# Patient Record
Sex: MALE | Race: WHITE | NOT HISPANIC OR LATINO | Employment: OTHER | URBAN - METROPOLITAN AREA
[De-identification: names, ages, dates, MRNs, and addresses within clinical notes are randomized per-mention and may not be internally consistent; named-entity substitution may affect disease eponyms.]

---

## 2018-12-03 ENCOUNTER — TELEPHONE (OUTPATIENT)
Dept: UROLOGY | Facility: CLINIC | Age: 83
End: 2018-12-03

## 2018-12-03 NOTE — TELEPHONE ENCOUNTER
Spoke to patient - r/s appointment with USMAN Hensley  due to provider will not be in office- ok with patient

## 2018-12-18 DIAGNOSIS — Z12.5 ENCOUNTER FOR PROSTATE CANCER SCREENING: Primary | ICD-10-CM

## 2018-12-21 ENCOUNTER — OFFICE VISIT (OUTPATIENT)
Dept: UROLOGY | Facility: CLINIC | Age: 83
End: 2018-12-21
Payer: MEDICARE

## 2018-12-21 VITALS
HEART RATE: 70 BPM | SYSTOLIC BLOOD PRESSURE: 122 MMHG | BODY MASS INDEX: 33.43 KG/M2 | DIASTOLIC BLOOD PRESSURE: 64 MMHG | HEIGHT: 67 IN | WEIGHT: 213 LBS

## 2018-12-21 DIAGNOSIS — C61 PROSTATE CANCER (HCC): Primary | ICD-10-CM

## 2018-12-21 PROCEDURE — 99213 OFFICE O/P EST LOW 20 MIN: CPT | Performed by: PHYSICIAN ASSISTANT

## 2018-12-21 RX ORDER — ERGOCALCIFEROL (VITAMIN D2) 10 MCG
TABLET ORAL
COMMUNITY

## 2018-12-21 RX ORDER — CHLORAL HYDRATE 500 MG
1 CAPSULE ORAL 3 TIMES DAILY
COMMUNITY

## 2018-12-21 RX ORDER — SIMVASTATIN 20 MG
20 TABLET ORAL
COMMUNITY

## 2018-12-21 RX ORDER — DILTIAZEM HYDROCHLORIDE 240 MG/1
240 CAPSULE, COATED, EXTENDED RELEASE ORAL
COMMUNITY
Start: 2018-10-18

## 2018-12-21 RX ORDER — CHOLECALCIFEROL (VITAMIN D3) 25 MCG
CAPSULE ORAL
COMMUNITY

## 2018-12-21 RX ORDER — DIGOXIN 125 MCG
0.12 TABLET ORAL
COMMUNITY
Start: 2018-10-18

## 2018-12-21 RX ORDER — FUROSEMIDE 20 MG/1
20 TABLET ORAL
COMMUNITY
Start: 2018-09-24 | End: 2019-09-24

## 2018-12-21 RX ORDER — LEVOTHYROXINE SODIUM 0.1 MG/1
100 TABLET ORAL
COMMUNITY
Start: 2018-09-13

## 2018-12-21 RX ORDER — CHOLECALCIFEROL (VITAMIN D3) 125 MCG
100 CAPSULE ORAL
COMMUNITY
Start: 2012-11-13

## 2018-12-21 RX ORDER — METOPROLOL TARTRATE 50 MG/1
TABLET, FILM COATED ORAL
COMMUNITY
Start: 2018-11-28

## 2018-12-21 RX ORDER — MAGNESIUM CHLORIDE 64 MG
TABLET, DELAYED RELEASE (ENTERIC COATED) ORAL
COMMUNITY

## 2018-12-21 NOTE — PROGRESS NOTES
UROLOGY PROGRESS NOTE   Patient Identifiers: Thai Du (MRN [id number])  Date of Service: 12/21/2018    Subjective:     19-year-old man with a history of [place] 7 prostate cancer  He was treated with external beam radiation and androgen deprivation therapy in 2011  PSA remains 0 05  Urine is clear  He had a cystoscopy for micro hematuria in 2016  He complains of some nocturia he has no incontinence or visible blood  Patient has  complaints of   Nocturia times 2-3         Objective:     VITALS:    Vitals:    12/21/18 1016   BP: 122/64   Pulse: 70           LABS:  No results found for: HGB, HCT, WBC, PLT]    No results found for: NA, K, CL, CO2, BUN, CREATININE, CALCIUM, GLUCOSE]        INPATIENT MEDS:    Current Outpatient Prescriptions:     apixaban (ELIQUIS) 2 5 mg, Take 2 5 mg by mouth, Disp: , Rfl:     Cholecalciferol (VITAMIN D-3) 1000 units CAPS, Take by mouth, Disp: , Rfl:     co-enzyme Q-10 100 mg capsule, Take 100 mg by mouth, Disp: , Rfl:     digoxin (LANOXIN) 0 125 mg tablet, Take 0 125 mg by mouth, Disp: , Rfl:     diltiazem (CARDIZEM CD) 240 mg 24 hr capsule, Take 240 mg by mouth, Disp: , Rfl:     furosemide (LASIX) 20 mg tablet, Take 20 mg by mouth, Disp: , Rfl:     hydrocortisone-pramoxine (ANALPRAM-HC) 2 5-1 % rectal cream, Reported on 1/20/2017 , Disp: , Rfl:     Ibrutinib 140 MG CAPS, Take 420 mg by mouth, Disp: , Rfl:     levothyroxine 100 mcg tablet, Take 100 mcg by mouth, Disp: , Rfl:     magnesium chloride (MAG64) 64 MG TBEC EC tablet, Take by mouth, Disp: , Rfl:     metoprolol tartrate (LOPRESSOR) 50 mg tablet, , Disp: , Rfl:     Multiple Vitamin (DAILY VALUE MULTIVITAMIN) TABS, Take by mouth, Disp: , Rfl:     Omega-3 Fatty Acids (FISH OIL) 1,000 mg, Take 1 capsule by mouth 3 (three) times a day, Disp: , Rfl:     simvastatin (ZOCOR) 20 mg tablet, Take 20 mg by mouth daily at bedtime, Disp: , Rfl:       Physical Exam:   /64 (BP Location: Left arm, Patient Position: Sitting, Cuff Size: Adult)   Pulse 70   Ht 5' 7" (1 702 m)   Wt 96 6 kg (213 lb)   BMI 33 36 kg/m²   GEN: no acute distress    RESP: breathing comfortably with no accessory muscle use    ABD: soft, non-tender, non-distended   INCISION:    EXT: no significant peripheral edema   (Male): Penis circumcised, phallus normal, meatus patent  Testicles descended into scrotum bilaterally without masses nor tenderness  No inguinal hernias bilaterally  MEG: Prostate  flat smooth with no nodules  The prostate is not boggy  The prostate is not tender  No nodules noted    RADIOLOGY:    none     Assessment:    1   Prostate cancer     Plan:   - follow-up in 1 year with PSA prior to visit  -  -  -

## 2019-12-31 ENCOUNTER — OFFICE VISIT (OUTPATIENT)
Dept: UROLOGY | Facility: CLINIC | Age: 84
End: 2019-12-31
Payer: MEDICARE

## 2019-12-31 VITALS
WEIGHT: 203 LBS | SYSTOLIC BLOOD PRESSURE: 120 MMHG | DIASTOLIC BLOOD PRESSURE: 64 MMHG | HEART RATE: 70 BPM | BODY MASS INDEX: 31.86 KG/M2 | HEIGHT: 67 IN

## 2019-12-31 DIAGNOSIS — C61 PROSTATE CANCER (HCC): Primary | ICD-10-CM

## 2019-12-31 PROCEDURE — 99213 OFFICE O/P EST LOW 20 MIN: CPT | Performed by: PHYSICIAN ASSISTANT

## 2019-12-31 NOTE — PROGRESS NOTES
UROLOGY PROGRESS NOTE   Patient Identifiers: Loc Felton (MRN [id number])  Date of Service: 12/31/2019    Subjective:    59-year-old man history of [place] 7 stage II prostate cancer  He was treated with external beam radiation and ADT in 2011  His PSA is 0 03  AUA index score is 7  He had a cystoscopy for micro hematuria in 2016  No incontinence or visible blood  No other complaints  Reason for visit:   Prostate cancer follow-up     Objective:     VITALS:    Vitals:    12/31/19 1413   BP: 120/64   Pulse: 70     AUA SYMPTOM SCORE      Most Recent Value   AUA SYMPTOM SCORE   How often have you had a sensation of not emptying your bladder completely after you finished urinating? 0   How often have you had to urinate again less than two hours after you finished urinating? 2   How often have you found you stopped and started again several times when you urinate?  0   How often have you found it difficult to postpone urination? 1   How often have you had a weak urinary stream?  1   How often have you had to push or strain to begin urination? 1   How many times did you most typically get up to urinate from the time you went to bed at night until the time you got up in the morning?   2   Quality of Life: If you were to spend the rest of your life with your urinary condition just the way it is now, how would you feel about that?  2   AUA SYMPTOM SCORE  7            LABS:  No results found for: HGB, HCT, WBC, PLT]    No results found for: NA, K, CL, CO2, BUN, CREATININE, CALCIUM, GLUCOSE]        INPATIENT MEDS:    Current Outpatient Medications:     apixaban (ELIQUIS) 2 5 mg, Take 2 5 mg by mouth, Disp: , Rfl:     Cholecalciferol (VITAMIN D-3) 1000 units CAPS, Take by mouth, Disp: , Rfl:     co-enzyme Q-10 100 mg capsule, Take 100 mg by mouth, Disp: , Rfl:     digoxin (LANOXIN) 0 125 mg tablet, Take 0 125 mg by mouth, Disp: , Rfl:     diltiazem (CARDIZEM CD) 240 mg 24 hr capsule, Take 240 mg by mouth, Disp: , Rfl:     hydrocortisone-pramoxine (ANALPRAM-HC) 2 5-1 % rectal cream, Reported on 1/20/2017 , Disp: , Rfl:     Ibrutinib 140 MG CAPS, Take 420 mg by mouth, Disp: , Rfl:     levothyroxine 100 mcg tablet, Take 100 mcg by mouth, Disp: , Rfl:     magnesium chloride (MAG64) 64 MG TBEC EC tablet, Take by mouth, Disp: , Rfl:     metoprolol tartrate (LOPRESSOR) 50 mg tablet, , Disp: , Rfl:     Multiple Vitamin (DAILY VALUE MULTIVITAMIN) TABS, Take by mouth, Disp: , Rfl:     Omega-3 Fatty Acids (FISH OIL) 1,000 mg, Take 1 capsule by mouth 3 (three) times a day, Disp: , Rfl:     simvastatin (ZOCOR) 20 mg tablet, Take 20 mg by mouth daily at bedtime, Disp: , Rfl:     furosemide (LASIX) 20 mg tablet, Take 20 mg by mouth, Disp: , Rfl:       Physical Exam:   /64 (BP Location: Left arm, Patient Position: Sitting, Cuff Size: Adult)   Pulse 70   Ht 5' 7" (1 702 m)   Wt 92 1 kg (203 lb)   BMI 31 79 kg/m²   GEN: no acute distress    RESP: breathing comfortably with no accessory muscle use    ABD: soft, non-tender, non-distended   INCISION:    EXT: no significant peripheral edema   (Male): Penis uncircumcised, phallus normal, meatus patent  Testicles descended into scrotum bilaterally without masses nor tenderness  No inguinal hernias bilaterally  MEG: Prostate is flat and smooth with no rectal masses    RADIOLOGY:    none     Assessment:    1   Prostate cancer     Plan:   - follow-up in 1 year for his annual exam  -  -  -

## 2020-03-31 ENCOUNTER — TELEPHONE (OUTPATIENT)
Dept: UROLOGY | Facility: MEDICAL CENTER | Age: 85
End: 2020-03-31

## 2020-03-31 DIAGNOSIS — R31.0 GROSS HEMATURIA: Primary | ICD-10-CM

## 2020-03-31 NOTE — TELEPHONE ENCOUNTER
Called and spoke to patient at this time, Advised per provider if bleeding gets worse Rebekah John recommends holding eliquis for 3 days  Advised patient continue with hydration and if symptoms change or if he has any other questions or concerns to give our office a call  PAtient states he understands

## 2020-03-31 NOTE — TELEPHONE ENCOUNTER
This is a patient of Dr Delmis Dowd seen by Radha Abebe in [place]  Patient has blood in his urine and he said its dark and red  Please call him at [phone]

## 2020-03-31 NOTE — TELEPHONE ENCOUNTER
Patient at the [facility] office, known for prostate cancer  Patient last office visit 12/31/2019  Called and spoke to patient at this time, Patient states starting Sunday he noticed his urine was "brown" like tea colored  States he has passed about 2 small blood clots one yesterday and one this morning  He states he has no pain when he urinates, he denies lower back pain, flank pain, and abdominal pain  He states he feels just fine otherwise  Patient does complain of frequency  He states his stream seems weaker, but he is able to urinate  Patient denies fever or chills  Patient states he is on a blood thinner eliquis  Patient denies any recent trauma, surgeries or heavy lifting  Patient states he has been aggressively hydrating  Advised patient will place order for urinalysis and urine culture  Advised to continue with aggressive hydration  Advised I will route to the provider for any other recommendations  Advised patient to call office if any symptoms change such as fever, pain increase in blood in urine  Patient verbalizes understanding and agrees to plan

## 2020-03-31 NOTE — TELEPHONE ENCOUNTER
Nikki Medrano it had radiation for prostate cancer in 2011 and is also on Eliquis  If his bleeding continues or gets worse I would have him hold his Eliquis for 3 days prior to resuming

## 2020-11-24 ENCOUNTER — TELEPHONE (OUTPATIENT)
Dept: UROLOGY | Facility: CLINIC | Age: 85
End: 2020-11-24

## 2023-09-08 ENCOUNTER — ANESTHESIA (OUTPATIENT)
Dept: GASTROENTEROLOGY | Facility: HOSPITAL | Age: 88
End: 2023-09-08

## 2023-09-08 ENCOUNTER — ANESTHESIA EVENT (OUTPATIENT)
Dept: GASTROENTEROLOGY | Facility: HOSPITAL | Age: 88
End: 2023-09-08

## 2023-09-08 ENCOUNTER — HOSPITAL ENCOUNTER (OUTPATIENT)
Dept: GASTROENTEROLOGY | Facility: HOSPITAL | Age: 88
Setting detail: OUTPATIENT SURGERY
End: 2023-09-08
Attending: INTERNAL MEDICINE
Payer: MEDICARE

## 2023-09-08 VITALS
HEART RATE: 70 BPM | BODY MASS INDEX: 30.76 KG/M2 | RESPIRATION RATE: 18 BRPM | SYSTOLIC BLOOD PRESSURE: 123 MMHG | OXYGEN SATURATION: 94 % | DIASTOLIC BLOOD PRESSURE: 74 MMHG | WEIGHT: 196 LBS | HEIGHT: 67 IN | TEMPERATURE: 96.8 F

## 2023-09-08 DIAGNOSIS — R13.10 DYSPHAGIA, UNSPECIFIED: ICD-10-CM

## 2023-09-08 PROBLEM — C91.10 CLL (CHRONIC LYMPHOCYTIC LEUKEMIA) (HCC): Status: ACTIVE | Noted: 2023-09-08

## 2023-09-08 PROBLEM — K21.9 GASTROESOPHAGEAL REFLUX DISEASE: Status: ACTIVE | Noted: 2023-09-08

## 2023-09-08 PROBLEM — N18.9 CHRONIC KIDNEY DISEASE: Status: ACTIVE | Noted: 2023-09-08

## 2023-09-08 PROBLEM — Z95.0 PACEMAKER: Status: ACTIVE | Noted: 2023-09-08

## 2023-09-08 PROBLEM — K22.4 ESOPHAGEAL DYSMOTILITY: Status: ACTIVE | Noted: 2023-09-08

## 2023-09-08 PROBLEM — E66.9 OBESITY (BMI 30-39.9): Status: ACTIVE | Noted: 2023-09-08

## 2023-09-08 PROBLEM — I48.21 PERMANENT ATRIAL FIBRILLATION (HCC): Status: ACTIVE | Noted: 2023-09-08

## 2023-09-08 RX ORDER — SODIUM CHLORIDE 9 MG/ML
INJECTION, SOLUTION INTRAVENOUS CONTINUOUS PRN
Status: DISCONTINUED | OUTPATIENT
Start: 2023-09-08 | End: 2023-09-08

## 2023-09-08 RX ORDER — PROPOFOL 10 MG/ML
INJECTION, EMULSION INTRAVENOUS CONTINUOUS PRN
Status: DISCONTINUED | OUTPATIENT
Start: 2023-09-08 | End: 2023-09-08

## 2023-09-08 RX ORDER — LIDOCAINE HYDROCHLORIDE 10 MG/ML
INJECTION, SOLUTION EPIDURAL; INFILTRATION; INTRACAUDAL; PERINEURAL AS NEEDED
Status: DISCONTINUED | OUTPATIENT
Start: 2023-09-08 | End: 2023-09-08

## 2023-09-08 RX ORDER — PROPOFOL 10 MG/ML
INJECTION, EMULSION INTRAVENOUS AS NEEDED
Status: DISCONTINUED | OUTPATIENT
Start: 2023-09-08 | End: 2023-09-08

## 2023-09-08 RX ADMIN — PROPOFOL 100 MCG/KG/MIN: 10 INJECTION, EMULSION INTRAVENOUS at 13:33

## 2023-09-08 RX ADMIN — LIDOCAINE HYDROCHLORIDE 50 MG: 10 INJECTION, SOLUTION EPIDURAL; INFILTRATION; INTRACAUDAL; PERINEURAL at 13:32

## 2023-09-08 RX ADMIN — SODIUM CHLORIDE: 9 INJECTION, SOLUTION INTRAVENOUS at 13:30

## 2023-09-08 RX ADMIN — PROPOFOL 40 MG: 10 INJECTION, EMULSION INTRAVENOUS at 13:33

## 2023-09-08 NOTE — ANESTHESIA POSTPROCEDURE EVALUATION
Post-Op Assessment Note    CV Status:  Stable  Pain Score: 0    Pain management: adequate     Mental Status:  Sleepy   Hydration Status:  Euvolemic   PONV Controlled:  Controlled   Airway Patency:  Patent      Post Op Vitals Reviewed: Yes      Staff: Anesthesiologist, CRNA         No notable events documented.     /62 (09/08/23 1348)    Temp (!) 96.8 °F (36 °C) (09/08/23 1348)    Pulse 70 (09/08/23 1348)   Resp 16 (09/08/23 1348)    SpO2 98 % (09/08/23 1348)

## 2023-09-08 NOTE — ANESTHESIA PREPROCEDURE EVALUATION
Procedure:  EGD    Relevant Problems   ANESTHESIA (within normal limits)      CARDIO   (+) Pacemaker   (+) Permanent atrial fibrillation (HCC)      GI/HEPATIC   (+) Gastroesophageal reflux disease      /RENAL   (+) Chronic kidney disease      Digestive   (+) Esophageal dysmotility      Other   (+) CLL (chronic lymphocytic leukemia) (HCC)   (+) Obesity (BMI 30-39. 9)        Physical Exam    Airway    Mallampati score: II  TM Distance: >3 FB  Neck ROM: full     Dental   Comment: Veneers,     Cardiovascular  Rhythm: regular, Rate: normal,     Pulmonary  Pulmonary exam normal Breath sounds clear to auscultation,     Other Findings        Anesthesia Plan  ASA Score- 3     Anesthesia Type- IV sedation with anesthesia with ASA Monitors. Additional Monitors:   Airway Plan:           Plan Factors-Exercise tolerance (METS): >4 METS. Chart reviewed. EKG reviewed. Existing labs reviewed. Patient summary reviewed. Patient is not a current smoker. Induction-     Postoperative Plan-     Informed Consent- Anesthetic plan and risks discussed with patient. I personally reviewed this patient with the CRNA. Discussed and agreed on the Anesthesia Plan with the CRNA. Matty Gauthier

## 2023-09-08 NOTE — H&P
H&P EXAM - Outpatient Endoscopy   Elyssa Bazan 80 y.o. male MRN: [id number]    [address] [place] [address] [facility] PRE/POST   Encounter: [id number]        Impression: dysphagia    Plan:egd    Chief Complaint: dysphagia    Physical Exam: alert   Chest: clear   Heart: reg

## 2024-07-22 ENCOUNTER — NURSING HOME VISIT (OUTPATIENT)
Dept: GERIATRICS | Facility: OTHER | Age: 89
End: 2024-07-22
Payer: MEDICARE

## 2024-07-22 VITALS
SYSTOLIC BLOOD PRESSURE: 123 MMHG | DIASTOLIC BLOOD PRESSURE: 76 MMHG | TEMPERATURE: 97.6 F | RESPIRATION RATE: 19 BRPM | BODY MASS INDEX: 31.45 KG/M2 | OXYGEN SATURATION: 94 % | WEIGHT: 200.8 LBS | HEART RATE: 71 BPM

## 2024-07-22 DIAGNOSIS — S12.591D OTHER CLOSED NONDISPLACED FRACTURE OF SIXTH CERVICAL VERTEBRA WITH ROUTINE HEALING, SUBSEQUENT ENCOUNTER: Primary | ICD-10-CM

## 2024-07-22 DIAGNOSIS — N18.32 STAGE 3B CHRONIC KIDNEY DISEASE (HCC): ICD-10-CM

## 2024-07-22 DIAGNOSIS — E03.9 ACQUIRED HYPOTHYROIDISM: ICD-10-CM

## 2024-07-22 DIAGNOSIS — K21.9 GASTROESOPHAGEAL REFLUX DISEASE, UNSPECIFIED WHETHER ESOPHAGITIS PRESENT: ICD-10-CM

## 2024-07-22 DIAGNOSIS — C91.10 CLL (CHRONIC LYMPHOCYTIC LEUKEMIA) (HCC): ICD-10-CM

## 2024-07-22 DIAGNOSIS — I10 BENIGN ESSENTIAL HYPERTENSION: Chronic | ICD-10-CM

## 2024-07-22 DIAGNOSIS — I48.21 PERMANENT ATRIAL FIBRILLATION (HCC): ICD-10-CM

## 2024-07-22 DIAGNOSIS — R26.2 AMBULATORY DYSFUNCTION: ICD-10-CM

## 2024-07-22 PROBLEM — I35.8 AORTIC VALVE SCLEROSIS: Status: ACTIVE | Noted: 2023-06-08

## 2024-07-22 PROBLEM — S06.0XAA CONCUSSION: Status: ACTIVE | Noted: 2024-07-18

## 2024-07-22 PROBLEM — S12.500A C6 CERVICAL FRACTURE (HCC): Status: ACTIVE | Noted: 2024-07-17

## 2024-07-22 PROCEDURE — 99306 1ST NF CARE HIGH MDM 50: CPT | Performed by: FAMILY MEDICINE

## 2024-07-22 NOTE — PROGRESS NOTES
[facility]  [address]  [place] [address]  [facility]   SNF [address]  History and Physical    NAME: Tanner Bazan  AGE: 89 y.o. SEX: male [id number]    DATE OF ENCOUNTER: 7/22/2024    Pain: no  Rehab Potential: good  Patient Informed of Medical Condition: yes  Patient is Capable of Understanding Their Right: yes  Prognosis: good  Discharge Plan: home with health services  Surrogate Decision Maker: daughter and son  Advanced Directives: yes  Code status: full code  PCP: Mckay Morfin MD    Assessment and Plan     C6 cervical fracture (HCC)  S/p fall  CT cervical spine 7/17/24 showed nondisplaced fracture line across the anterior and right aspect of the C6 vertebral body, extending to the right lateral aspect of the C6-C7 disc space. No malalignment.   C-collar as per neurosurgery recommendations  OT/PT  Pain controlled with Tylenol PRN  F/u with neurosuregery in 4 weeks    Benign essential hypertension  Currently on diltiazem 120 mg daily and lopressor 25 mg BID for permanent Afib  BP stable since SNF admission  Continue monitoring BP    Permanent atrial fibrillation (HCC)  Permanent Afib with pacemaker  RRR  Continue digoxin 125 mcg daily, diltiazem 120 mg daily, and lopressor 25 mg BID  F/u with cardiology    Gastroesophageal reflux disease  With esophageal stricture s/p dilation  Maintained on omeprazole 20 mg daily for reflux esophagitis    Acquired hypothyroidism  TSH was WNL 0.92 (9/7/23)  Continue home dose of levothyroxine 100 mg daily while in SNF  Needs f/u with Endo and repeat TSH    Chronic kidney disease  Lab Results   Component Value Date    EGFR 41 (L) 07/18/2024    EGFR 34 (L) 07/17/2024    EGFR 46 (L) 07/16/2024    CREATININE 1.57 (H) 07/18/2024    CREATININE 1.83 (H) 07/17/2024    CREATININE 1.42 (H) 07/16/2024   Stable, at baseline  Ensure adequate hydration  Avoid NSAIDs    CLL (chronic lymphocytic leukemia) (HCC)  With WBC normal 4.5, Hgb 14.4, low PLT  106 (7/18/24)  Follows with Dr. Lamb      Ambulatory dysfunction  Ambulatory dysfunction with fall sustaining a C6 fracture  High risk of recurrent fall due to age, history of fall, comorbidities  Admitted to SNF  OT/PT/ nutrition support  Fall precautions    All medications and routine orders were reviewed and updated as needed.  Plan discussed with: Patient. Coordination of care with nursing, OT/PT, SW, dietician.    Chief Complaint     Seen for admission at Skilled Nursing Facility    History of Present Illness     89 y.o. male with HTN, permanent A-fib, CLL, CAD, HLD, stage IIIb CKD, cardiac pacemaker, hypothyroidism, GERD who is seen today, following hospitalization at [facility] from 7/17/2024 to 7/19/2024 after a fall sustaining C6 fracture.  Neurosurgery was consulted, recommending c-collar at all times, and follow-up outpatient in 4 weeks.  His pain was controlled with IV medications, which was later transitioned to oral medications.  Cardiology recommended syncope workup, including checking orthostatics.  His home dose of metoprolol and was adjusted.  Inpatient OT/PT evaluated and recommended to discharge home with outpatient PT/OT.  However, he lives alone and no one to assist   He is sitting in recliner, asking when he can go home to get something in his workshop. Reports good appetite, good sleep. He denies any neck pain, arm pain, tingling or numbness.  No headache, CP, SOB, or palpitations reported.  Denies dysuria. Had BM.    HISTORY:  Social History     Socioeconomic History    Marital status: /Civil Union     Spouse name: None    Number of children: None    Years of education: None    Highest education level: None   Occupational History    None   Tobacco Use    Smoking status: Never    Smokeless tobacco: Never   Substance and Sexual Activity    Alcohol use: Yes    Drug use: No    Sexual activity: None   Other Topics Concern    None   Social History Narrative    None     Social  Determinants of Health     Financial Resource Strain: Low Risk  (7/17/2024)    Received from [facility]    Overall Financial Resource Strain (CARDIA)     Difficulty of Paying Living Expenses: Not hard at all   Food Insecurity: No Food Insecurity (7/17/2024)    Received from [facility]    Hunger Vital Sign     Worried About Running Out of Food in the Last Year: Never true     Ran Out of Food in the Last Year: Never true   Transportation Needs: No Transportation Needs (7/17/2024)    Received from [facility]    PRAPARE - Transportation     Lack of Transportation (Medical): No     Lack of Transportation (Non-Medical): No   Physical Activity: Not on file   Stress: No Stress Concern Present (6/5/2023)    Received from [facility]    [place] [employer] - Occupational Stress Questionnaire     Feeling of Stress : Only a little   Social Connections: Moderately Integrated (6/5/2023)    Received from [facility]    Social Connection and Isolation Panel [NHANES]     Frequency of Communication with Friends and Family: More than three times a week     Frequency of Social Gatherings with Friends and Family: More than three times a week     Attends Jain Services: More than 4 times per year     Active Member of Clubs or Organizations: Yes     Attends Club or Organization Meetings: More than 4 times per year     Marital Status:    Intimate Partner Violence: Not At Risk (7/17/2024)    Received from [facility]    Humiliation, Afraid, Rape, and Kick questionnaire     Fear of Current or Ex-Partner: No     Emotionally Abused: No     Physically Abused: No     Sexually Abused: No   Housing Stability: Low Risk  (7/17/2024)    Received from [facility]    Housing Stability Vital Sign     Unable to Pay for Housing in the Last Year: No     Number of Times Moved in the Last Year: 0      Homeless in the Last Year: No     Allergies:  Allergies   Allergen Reactions    Other      Other reaction(s): GI upset    Penicillins      Other reaction(s): Other (See Comments)  thrush       Review of Systems   As per HPI, otherwise negative  Medications and orders   All medications reviewed and updated in skilled nursing EMR.  Objective     /76   Pulse 71   Temp 97.6 °F (36.4 °C)   Resp 19   Wt 91.1 kg (200 lb 12.8 oz)   SpO2 94%   BMI 31.45 kg/m²     Physical Exam  Vitals and nursing note reviewed.   General: no acute distress.  HENT:      Head: Normocephalic.      Nose: Nose normal.      Mouth: Mucous membranes are moist.      Neck: C-collar in place  Eyes:       Right eye: No discharge.         Left eye: No discharge.      Conjunctivae normal.   Cardiovascular:      Normal rate and regular rhythm. No murmur heard.  Pulmonary:      Pulmonary effort is normal. No wheezing, rhonchi or rales.   Abdominal:      Bowel sounds are normal. There is no distension.      Abdomen is soft. There is no abdominal tenderness.   Musculoskeletal:      Cervical back: Neck supple.      Right lower le+ edema.      Left lower le+ edema.   Skin:     General: Skin is warm. Hyperpigmentation 2/2 chronic venous stasis  Neurological: alert.      Oriented to person, place, and time. Cooperative, follow commands      Behavior: normal.     Pertinent Laboratory/Diagnostic Studies:   The following labs/studies were reviewed please see chart or hospital paperwork for details.    Labs & Imaging:  Lab Results   Component Value Date    SODIUM 141 2024    K 4.3 2024     2024    CO2 25 2024    AGAP 7 2024    BUN 27 2024    CREATININE 1.57 (H) 2024    GLUC 116 (H) 2024    CALCIUM 9.4 2024    AST 33 2024    ALT 31 2024    ALKPHOS 54 2024    TP 6.5 2024    TBILI 0.8 2024    EGFR 41 (L) 2024     Lab Results   Component Value Date     HYLLLSET82 410 12/29/2021     Lab Results   Component Value Date    TSH 0.92 09/07/2023     CT CERVICAL SPINE WO CONTRAST 7/17/2024  Impression: Nondisplaced fracture line across the anterior and right aspect of the C6 vertebral body, extending to the right lateral aspect of the C6-C7 disc space. No malalignment.     I have spent a total time of 60 minutes on 07/22/24 in caring for this patient including Prognosis, Risks and benefits of tx options, Instructions for management, Patient and family education, Counseling / Coordination of care, Documenting in the medical record, Reviewing / ordering tests, medicine, procedures  , Obtaining or reviewing history  , and Communicating with other healthcare professionals .    Gina Reed MD

## 2024-07-23 NOTE — ASSESSMENT & PLAN NOTE
With esophageal stricture s/p dilation  Maintained on omeprazole 20 mg daily for reflux esophagitis

## 2024-07-23 NOTE — ASSESSMENT & PLAN NOTE
TSH was WNL 0.92 (9/7/23)  Continue home dose of levothyroxine 100 mg daily while in SNF  Needs f/u with Endo and repeat TSH

## 2024-07-23 NOTE — ASSESSMENT & PLAN NOTE
S/p fall  CT cervical spine 7/17/24 showed nondisplaced fracture line across the anterior and right aspect of the C6 vertebral body, extending to the right lateral aspect of the C6-C7 disc space. No malalignment.   C-collar as per neurosurgery recommendations  OT/PT  Pain controlled with Tylenol PRN  F/u with neurosuregery in 4 weeks

## 2024-07-23 NOTE — ASSESSMENT & PLAN NOTE
Permanent Afib with pacemaker  RRR  Continue digoxin 125 mcg daily, diltiazem 120 mg daily, and lopressor 25 mg BID  F/u with cardiology

## 2024-07-23 NOTE — ASSESSMENT & PLAN NOTE
Currently on diltiazem 120 mg daily and lopressor 25 mg BID for permanent Afib  BP stable since SNF admission  Continue monitoring BP

## 2024-07-23 NOTE — ASSESSMENT & PLAN NOTE
Ambulatory dysfunction with fall sustaining a C6 fracture  High risk of recurrent fall due to age, history of fall, comorbidities  Admitted to SNF  OT/PT/ nutrition support  Fall precautions

## 2024-07-23 NOTE — ASSESSMENT & PLAN NOTE
Lab Results   Component Value Date    EGFR 41 (L) 07/18/2024    EGFR 34 (L) 07/17/2024    EGFR 46 (L) 07/16/2024    CREATININE 1.57 (H) 07/18/2024    CREATININE 1.83 (H) 07/17/2024    CREATININE 1.42 (H) 07/16/2024   Stable, at baseline  Ensure adequate hydration  Avoid NSAIDs

## 2024-07-24 ENCOUNTER — NURSING HOME VISIT (OUTPATIENT)
Dept: GERIATRICS | Facility: OTHER | Age: 89
End: 2024-07-24
Payer: MEDICARE

## 2024-07-24 VITALS
TEMPERATURE: 97.9 F | SYSTOLIC BLOOD PRESSURE: 118 MMHG | DIASTOLIC BLOOD PRESSURE: 66 MMHG | HEART RATE: 68 BPM | BODY MASS INDEX: 31.58 KG/M2 | WEIGHT: 201.6 LBS

## 2024-07-24 DIAGNOSIS — K21.9 GASTROESOPHAGEAL REFLUX DISEASE, UNSPECIFIED WHETHER ESOPHAGITIS PRESENT: ICD-10-CM

## 2024-07-24 DIAGNOSIS — C91.10 CLL (CHRONIC LYMPHOCYTIC LEUKEMIA) (HCC): ICD-10-CM

## 2024-07-24 DIAGNOSIS — N18.32 STAGE 3B CHRONIC KIDNEY DISEASE (HCC): ICD-10-CM

## 2024-07-24 DIAGNOSIS — E03.9 ACQUIRED HYPOTHYROIDISM: ICD-10-CM

## 2024-07-24 DIAGNOSIS — S12.591D OTHER CLOSED NONDISPLACED FRACTURE OF SIXTH CERVICAL VERTEBRA WITH ROUTINE HEALING, SUBSEQUENT ENCOUNTER: Primary | ICD-10-CM

## 2024-07-24 DIAGNOSIS — I48.21 PERMANENT ATRIAL FIBRILLATION (HCC): ICD-10-CM

## 2024-07-24 DIAGNOSIS — I10 BENIGN ESSENTIAL HYPERTENSION: Chronic | ICD-10-CM

## 2024-07-24 DIAGNOSIS — R26.2 AMBULATORY DYSFUNCTION: ICD-10-CM

## 2024-07-24 PROCEDURE — 99316 NF DSCHRG MGMT 30 MIN+: CPT

## 2024-07-24 NOTE — PROGRESS NOTES
[facility]  [address] [place] [address]  [phone]  FACILITY: [facility]  Code 31 (STR)      Discharging Physician / Practitioner: ALIS Salas  PCP: Mckay Morfin MD  Admission Date: 7/19/2024  Discharge Date: 7/25/2024  Encounter Date: 7/24/2024    1. Other closed nondisplaced fracture of sixth cervical vertebra with routine healing, subsequent encounter  Assessment & Plan:  S/p fall  CT cervical spine 7/17/24 showed nondisplaced fracture line across the anterior and right aspect of the C6 vertebral body, extending to the right lateral aspect of the C6-C7 disc space. No malalignment.   C-collar as per neurosurgery recommendations  PT/OT  Pain controlled with Tylenol PRN  F/u with neurosuregery scheduled on 8/15/2024  2. Benign essential hypertension  Assessment & Plan:  Blood pressure acceptable  Lopressor recently reduced inpatient from 50 mg twice daily to 25 mg twice daily  Follow-up outpatient with PCP  Orders:  -     metoprolol tartrate (LOPRESSOR) 25 mg tablet; Take 1 tablet (25 mg total) by mouth every 12 (twelve) hours  3. Permanent atrial fibrillation (HCC)  Assessment & Plan:  With PPM  Continue digoxin, diltiazem and Lopressor  Continue Eliquis for anticoagulation  4. Gastroesophageal reflux disease, unspecified whether esophagitis present  Assessment & Plan:  With esophageal stricture s/p dilation  Maintained on omeprazole 20 mg daily for reflux esophagitis  5. Acquired hypothyroidism  Assessment & Plan:  Continue home dose of Synthroid  6. Ambulatory dysfunction  Assessment & Plan:  In the setting of above  Continue PT/OT  Maintain fall and safety precautions  Encourage appropriate DME use     7. CLL (chronic lymphocytic leukemia) (HCC)  Assessment & Plan:  Follows with Dr. Lamb    8. Stage 3b chronic kidney disease (HCC)  Assessment & Plan:  Lab Results   Component Value Date    EGFR 41 (L) 07/18/2024    EGFR 34 (L) 07/17/2024    EGFR 46 (L) 07/16/2024     CREATININE 1.57 (H) 07/18/2024    CREATININE 1.83 (H) 07/17/2024    CREATININE 1.42 (H) 07/16/2024   Stable  Avoid nephrotoxins, NSAIDs      Reason for Admission: C6 fracture    History of Present Illness:     Patient is a 89 y.o. male with HTN, permanent A-fib, CLL, CAD, HLD, stage IIIb CKD, cardiac pacemaker, hypothyroidism, GERD.    Hospital course:  Patient was hospitalization at [facility] from 7/17/2024 to 7/19/2024 after a fall sustaining C6 fracture.  Neurosurgery was consulted, recommending c-collar at all times, and follow-up outpatient in 4 weeks.  His pain was controlled with IV medications, which was later transitioned to oral medications.  Cardiology recommended syncope workup, including checking orthostatics.  His home dose of metoprolol and was adjusted.      Rehab course:  Patient participated in comprehensive physical and occupational therapy with overall improvement in ambulatory function. He remained medically stable with no further medication adjustments.     On exam patient is doing well without acute distress. He denies CP/SOB. Reports no neck pain with neck brace on. He will be returning to independent living apartment tomorrow. He has appropriate follow up scheduled. Medication changes were discussed, patient declined new prescription sent to pharmacy as he will just cut his current metoprolol 50 mg pills in half for new dosage.         Please see above list of diagnoses and related plan for additional information.     Condition at Discharge: stable     Discharge Day Visit / Exam:     Subjective:   Review of Systems   Musculoskeletal:  Positive for neck pain.   All other systems reviewed and are negative.    Vitals: Blood Pressure: 118/66 (07/24/24 1122)  Pulse: 68 (07/24/24 1122)  Temperature: 97.9 °F (36.6 °C) (07/24/24 1122)  Weight - Scale: 91.4 kg (201 lb 9.6 oz) (07/24/24 1122)  Exam:   Physical Exam  Vitals reviewed.   Constitutional:       General: He is not in acute distress.      Appearance: Normal appearance. He is not ill-appearing, toxic-appearing or diaphoretic.   HENT:      Head: Normocephalic and atraumatic.   Eyes:      Conjunctiva/sclera: Conjunctivae normal.   Cardiovascular:      Rate and Rhythm: Normal rate and regular rhythm.      Pulses: Normal pulses.      Heart sounds: Normal heart sounds. No murmur heard.  Pulmonary:      Effort: Pulmonary effort is normal. No respiratory distress.      Breath sounds: Normal breath sounds.   Abdominal:      General: Bowel sounds are normal. There is no distension.      Palpations: Abdomen is soft.      Tenderness: There is no abdominal tenderness.   Musculoskeletal:      Right lower leg: No edema.      Left lower leg: No edema.   Skin:     General: Skin is warm and dry.   Neurological:      General: No focal deficit present.      Mental Status: He is alert and oriented to person, place, and time.   Psychiatric:         Mood and Affect: Mood normal.         Behavior: Behavior normal.         Thought Content: Thought content normal.           Discharge instructions/Information to patient and family:   See after visit summary for information provided to patient and family.      Provisions for Follow-Up Care:  See after visit summary for information related to follow-up care and any pertinent home health orders.      Disposition:     Other: [facility]       Discharge Statement:  I spent 40 minutes discharging the patient. This time was spent on the day of discharge. I had direct contact with the patient on the day of discharge. Greater than 50% of the total time was spent examining patient, answering all patient questions, arranging and discussing plan of care with patient as well as directly providing post-discharge instructions.  Additional time then spent on discharge activities.    Discharge Medications:  See after visit summary for reconciled discharge medications provided to patient and family.      ** Please Note:  This note has been constructed using a voice recognition system **

## 2024-07-24 NOTE — ASSESSMENT & PLAN NOTE
Lab Results   Component Value Date    EGFR 41 (L) 07/18/2024    EGFR 34 (L) 07/17/2024    EGFR 46 (L) 07/16/2024    CREATININE 1.57 (H) 07/18/2024    CREATININE 1.83 (H) 07/17/2024    CREATININE 1.42 (H) 07/16/2024   Stable  Avoid nephrotoxins, NSAIDs

## 2024-07-24 NOTE — ASSESSMENT & PLAN NOTE
In the setting of above  Continue PT/OT  Maintain fall and safety precautions  Encourage appropriate DME use

## 2024-07-24 NOTE — ASSESSMENT & PLAN NOTE
S/p fall  CT cervical spine 7/17/24 showed nondisplaced fracture line across the anterior and right aspect of the C6 vertebral body, extending to the right lateral aspect of the C6-C7 disc space. No malalignment.   C-collar as per neurosurgery recommendations  PT/OT  Pain controlled with Tylenol PRN  F/u with neurosuregery scheduled on 8/15/2024

## 2024-07-24 NOTE — ASSESSMENT & PLAN NOTE
Blood pressure acceptable  Lopressor recently reduced inpatient from 50 mg twice daily to 25 mg twice daily  Follow-up outpatient with PCP